# Patient Record
(demographics unavailable — no encounter records)

---

## 2024-10-28 NOTE — PLAN
[FreeTextEntry1] : #Epistaxis - ENT referral  #Teeth Grinding - Continue wearing  - Continue routine dental care and follow-up - Continue working with therapist and psychiatrist to manage stress - Botox to jaw via ENT vs Derm referral  #New Moles - Derm referral  #H/o h pylori and chronic GI disturbance - Will closely monitor, not do any aggressive testing during this stressful period - Test of cure - Close f/u  #Health Maintenance - Labs today - Flu and Covid vaccines ordered - Has mammogram referral from gyn - F/u 1 year

## 2024-10-28 NOTE — REVIEW OF SYSTEMS
[Nosebleed] : nosebleed [Constipation] : constipation [Mole Changes] : mole changes [Fever] : no fever [Chills] : no chills [Hearing Loss] : no hearing loss [Sore Throat] : no sore throat [Chest Pain] : no chest pain [Palpitations] : no palpitations [Lower Ext Edema] : no lower extremity edema [Shortness Of Breath] : no shortness of breath [Wheezing] : no wheezing [Cough] : no cough [Abdominal Pain] : no abdominal pain [Nausea] : no nausea [Vomiting] : no vomiting [Itching] : no itching [Skin Rash] : no skin rash [Suicidal] : not suicidal [Anxiety] : no anxiety [Depression] : no depression

## 2024-10-28 NOTE — HISTORY OF PRESENT ILLNESS
[FreeTextEntry1] : establish care, nosebleeds, mole, teeth grinding [de-identified] : Ms. LEWIS is a 40 year y/o F with PMH of scoliosis s/p spinal fusion, depression who presents as a new patient today to establish care. 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  #Dermatology: mole on face is new Noticed by patient's sister  #GI Concerns Did a breath test, took antibiotics, no test of cure Can recheck for cure Thinks 2-3 years ago Chronic constipation Ups and downs Thinks some is emotional Bloated Has Linzess, does not take a lot Has herbal laxative tea

## 2024-10-28 NOTE — HISTORY OF PRESENT ILLNESS
[FreeTextEntry1] : establish care, nosebleeds, mole, teeth grinding [de-identified] : Ms. LEWIS is a 40 year y/o F with PMH of scoliosis s/p spinal fusion, depression who presents as a new patient today to establish care. 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  #Dermatology: mole on face is new Noticed by patient's sister  #GI Concerns Did a breath test, took antibiotics, no test of cure Can recheck for cure Thinks 2-3 years ago Chronic constipation Ups and downs Thinks some is emotional Bloated Has Linzess, does not take a lot Has herbal laxative tea

## 2024-10-28 NOTE — ASSESSMENT
[FreeTextEntry1] : 41 y/o F with PMH of scoliosis s/p spinal fusion, depression stable on SSRI, who presents to establish care, with new recurrent epistaxis, ongoing teeth grinding, and 2 new moles.  #New Epistaxis, L Nostril Likely a vessel problem in L nostril Has had 4-5 nosebleeds in the last month, this has not been an issue for her before  #Teeth Grinding Wears  religiously Has brought up with her dentist Worsening in the setting of stressful time in her life right now Interested in botox of the jaw area  #New Moles Has family h/o skin cancer, 2 new facial moles ~0.5 cm symmetrical brown mole on R cheek, small pink-alexa marks under L eye Requests derm referral  #H/o h pylori s/p treatment #Chronic Constipation/GI issues Has not had test of cure, Ongoing chronic GI issues which she partially attributes to stress  #Health Maintenance Ok with routine labs, is due for flu and covid vaccines UTD on pap

## 2024-10-28 NOTE — HEALTH RISK ASSESSMENT
[Yes] : Yes [2 - 3 times a week (3 pts)] : 2 - 3  times a week (3 points) [1 or 2 (0 pts)] : 1 or 2 (0 points) [Never (0 pts)] : Never (0 points) [No] : In the past 12 months have you used drugs other than those required for medical reasons? No [PHQ-2 Negative - No further assessment needed] : PHQ-2 Negative - No further assessment needed [Never] : Never

## 2024-10-28 NOTE — PHYSICAL EXAM
[No Acute Distress] : no acute distress [Well Nourished] : well nourished [Well Developed] : well developed [Well-Appearing] : well-appearing [Normal Sclera/Conjunctiva] : normal sclera/conjunctiva [EOMI] : extraocular movements intact [Normal Outer Ear/Nose] : the outer ears and nose were normal in appearance [Normal Oropharynx] : the oropharynx was normal [No Lymphadenopathy] : no lymphadenopathy [Supple] : supple [No Respiratory Distress] : no respiratory distress  [No Accessory Muscle Use] : no accessory muscle use [Clear to Auscultation] : lungs were clear to auscultation bilaterally [Normal Rate] : normal rate  [Regular Rhythm] : with a regular rhythm [Normal S1, S2] : normal S1 and S2 [No Murmur] : no murmur heard [Soft] : abdomen soft [Non Tender] : non-tender [Non-distended] : non-distended [Normal Bowel Sounds] : normal bowel sounds [Normal Posterior Cervical Nodes] : no posterior cervical lymphadenopathy [Normal Anterior Cervical Nodes] : no anterior cervical lymphadenopathy [No Rash] : no rash [Coordination Grossly Intact] : coordination grossly intact [Normal Gait] : normal gait [Normal Affect] : the affect was normal [Normal Insight/Judgement] : insight and judgment were intact [Acne] : no acne